# Patient Record
Sex: MALE | Race: OTHER | NOT HISPANIC OR LATINO | URBAN - METROPOLITAN AREA
[De-identification: names, ages, dates, MRNs, and addresses within clinical notes are randomized per-mention and may not be internally consistent; named-entity substitution may affect disease eponyms.]

---

## 2019-04-26 ENCOUNTER — EMERGENCY (EMERGENCY)
Facility: HOSPITAL | Age: 49
LOS: 1 days | Discharge: HOME | End: 2019-04-26
Admitting: EMERGENCY MEDICINE
Payer: SELF-PAY

## 2019-04-26 VITALS
RESPIRATION RATE: 16 BRPM | OXYGEN SATURATION: 100 % | TEMPERATURE: 99 F | HEART RATE: 86 BPM | SYSTOLIC BLOOD PRESSURE: 124 MMHG | DIASTOLIC BLOOD PRESSURE: 82 MMHG

## 2019-04-26 PROCEDURE — 99283 EMERGENCY DEPT VISIT LOW MDM: CPT

## 2019-04-26 NOTE — ED PROVIDER NOTE - PROGRESS NOTE DETAILS
pt was refusing xray until his bag was returned by EMS. Bag no returned and pt does not want xray, wants to be discharged. Pt understands risks of injury to back including broken back but still wants to leave.
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
## 2019-04-26 NOTE — ED PROVIDER NOTE - PHYSICAL EXAMINATION
CONSTITUTIONAL: Well-appearing; well-nourished; in no apparent distress.   HEAD: Normocephalic; atraumatic.   NECK: Supple; non-tender;   CARDIOVASCULAR: Normal S1, S2; no murmurs, rubs, or gallops. Regular rate and rhythm.   RESPIRATORY: Breathing easily; breath sounds clear and equal bilaterally; no wheezes, rhonchi, or rales.  MSK: FROM at all extremities,   Back: +ttp to   Neuro: CN 2-12 intact, normal finger to nose, normal gait, strength normal CONSTITUTIONAL: Well-appearing; well-nourished; in no apparent distress.   HEAD: Normocephalic; atraumatic.   NECK: Supple; non-tender;   CARDIOVASCULAR: Normal S1, S2; no murmurs, rubs, or gallops. Regular rate and rhythm.   RESPIRATORY: Breathing easily; breath sounds clear and equal bilaterally; no wheezes, rhonchi, or rales.  MSK: FROM at all extremities,   Back: +ttp to lumbar spinal and b/l paraspinal muscles, pain with movements   Neuro: CN 2-12 intact, normal finger to nose, normal gait, strength normal

## 2019-04-26 NOTE — ED ADULT TRIAGE NOTE - OTHER COMPLAINTS
hx of arthritis. ambulated to EMS stretcher without difficulty as per EMS. patient sleeping comfortably on arrival.

## 2019-04-26 NOTE — ED ADULT NURSE NOTE - CHPI ED NUR SYMPTOMS NEG
no weakness/no abrasion/no difficulty bearing weight/no tingling/no numbness/no stiffness/no bruising/no deformity/no fever
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
## 2019-04-30 DIAGNOSIS — E11.9 TYPE 2 DIABETES MELLITUS WITHOUT COMPLICATIONS: ICD-10-CM

## 2019-04-30 DIAGNOSIS — M54.9 DORSALGIA, UNSPECIFIED: ICD-10-CM
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
## 2019-04-30 DIAGNOSIS — S30.0XXA CONTUSION OF LOWER BACK AND PELVIS, INITIAL ENCOUNTER: ICD-10-CM
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
## 2023-03-16 ENCOUNTER — EMERGENCY (EMERGENCY)
Facility: HOSPITAL | Age: 53
LOS: 1 days | Discharge: HOME | End: 2023-03-16
Attending: EMERGENCY MEDICINE | Admitting: EMERGENCY MEDICINE
Payer: MEDICAID

## 2023-03-16 VITALS
DIASTOLIC BLOOD PRESSURE: 83 MMHG | HEART RATE: 92 BPM | OXYGEN SATURATION: 100 % | RESPIRATION RATE: 18 BRPM | TEMPERATURE: 98 F | SYSTOLIC BLOOD PRESSURE: 145 MMHG

## 2023-03-16 VITALS
SYSTOLIC BLOOD PRESSURE: 156 MMHG | HEIGHT: 64 IN | DIASTOLIC BLOOD PRESSURE: 87 MMHG | TEMPERATURE: 97 F | HEART RATE: 105 BPM | OXYGEN SATURATION: 97 % | RESPIRATION RATE: 18 BRPM | WEIGHT: 139.99 LBS

## 2023-03-16 PROCEDURE — 12002 RPR S/N/AX/GEN/TRNK2.6-7.5CM: CPT

## 2023-03-16 PROCEDURE — 70450 CT HEAD/BRAIN W/O DYE: CPT | Mod: 26

## 2023-03-16 PROCEDURE — 72125 CT NECK SPINE W/O DYE: CPT | Mod: 26

## 2023-03-16 PROCEDURE — 73130 X-RAY EXAM OF HAND: CPT | Mod: 26,RT

## 2023-03-16 PROCEDURE — 99285 EMERGENCY DEPT VISIT HI MDM: CPT | Mod: 25

## 2023-03-16 PROCEDURE — 73090 X-RAY EXAM OF FOREARM: CPT | Mod: 26,RT

## 2023-03-16 RX ORDER — ACETAMINOPHEN 500 MG
650 TABLET ORAL ONCE
Refills: 0 | Status: COMPLETED | OUTPATIENT
Start: 2023-03-16 | End: 2023-03-16

## 2023-03-16 RX ORDER — METHOCARBAMOL 500 MG/1
750 TABLET, FILM COATED ORAL ONCE
Refills: 0 | Status: COMPLETED | OUTPATIENT
Start: 2023-03-16 | End: 2023-03-16

## 2023-03-16 RX ORDER — IBUPROFEN 200 MG
600 TABLET ORAL ONCE
Refills: 0 | Status: COMPLETED | OUTPATIENT
Start: 2023-03-16 | End: 2023-03-16

## 2023-03-16 RX ORDER — TETANUS TOXOID, REDUCED DIPHTHERIA TOXOID AND ACELLULAR PERTUSSIS VACCINE, ADSORBED 5; 2.5; 8; 8; 2.5 [IU]/.5ML; [IU]/.5ML; UG/.5ML; UG/.5ML; UG/.5ML
0.5 SUSPENSION INTRAMUSCULAR ONCE
Refills: 0 | Status: COMPLETED | OUTPATIENT
Start: 2023-03-16 | End: 2023-03-16

## 2023-03-16 RX ADMIN — TETANUS TOXOID, REDUCED DIPHTHERIA TOXOID AND ACELLULAR PERTUSSIS VACCINE, ADSORBED 0.5 MILLILITER(S): 5; 2.5; 8; 8; 2.5 SUSPENSION INTRAMUSCULAR at 17:56

## 2023-03-16 RX ADMIN — Medication 600 MILLIGRAM(S): at 20:09

## 2023-03-16 NOTE — ED PROVIDER NOTE - IV ALTEPLASE EXCL ABS HIDDEN
What Type Of Note Output Would You Prefer (Optional)?: Standard Output
How Severe Is Your Rash?: moderate
Is This A New Presentation, Or A Follow-Up?: Rash
Additional History: Patient has some new medications Atorvastatin and amlodipine. Patient states he was using his Efudex cream as well. See photos.
show
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
## 2023-03-16 NOTE — ED PROVIDER NOTE - MUSCULOSKELETAL, MLM
R hand mild dorsum tenderness, no deformity, abrasion on top of dorsum of hand, not extending deeper than dermis. L forearm mid mild tenderness, no deformity
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
## 2023-03-16 NOTE — ED PROVIDER NOTE - CLINICAL SUMMARY MEDICAL DECISION MAKING FREE TEXT BOX
will update tetanus, provide analgesia and get xrays to r/o fracture and CT head/neck to r/o bleed/fx.

## 2023-03-16 NOTE — ED PROVIDER NOTE - PROGRESS NOTE DETAILS
normal scans, normal xrays - radiology report unavailable. local care provided. will dc under police custody

## 2023-03-16 NOTE — ED PROVIDER NOTE - PATIENT PORTAL LINK FT
You can access the FollowMyHealth Patient Portal offered by [facility] by registering at the following website: [email or web address]. By joining [facility]’s FollowMyHealth portal, you will also be able to view your health information using other applications (apps) compatible with our system.
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
## 2023-03-17 PROBLEM — M19.90 UNSPECIFIED OSTEOARTHRITIS, UNSPECIFIED SITE: Chronic | Status: ACTIVE | Noted: 2019-04-26

## 2023-03-20 DIAGNOSIS — Z23 ENCOUNTER FOR IMMUNIZATION: ICD-10-CM

## 2023-03-20 DIAGNOSIS — S09.90XA UNSPECIFIED INJURY OF HEAD, INITIAL ENCOUNTER: ICD-10-CM

## 2023-03-20 DIAGNOSIS — R42 DIZZINESS AND GIDDINESS: ICD-10-CM
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
## 2023-03-20 DIAGNOSIS — R51.9 HEADACHE, UNSPECIFIED: ICD-10-CM
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
## 2023-03-20 DIAGNOSIS — E11.9 TYPE 2 DIABETES MELLITUS WITHOUT COMPLICATIONS: ICD-10-CM

## 2023-03-20 DIAGNOSIS — M45.9 ANKYLOSING SPONDYLITIS OF UNSPECIFIED SITES IN SPINE: ICD-10-CM

## 2023-03-20 DIAGNOSIS — S60.416A ABRASION OF RIGHT LITTLE FINGER, INITIAL ENCOUNTER: ICD-10-CM

## 2024-04-29 NOTE — ED ADULT TRIAGE NOTE - HEART RATE (BEATS/MIN)
Case Management Assessment & Discharge Planning Note    Patient name Liu Angel  Location /-01 MRN [id number]  : 1940 Date 2024       Current Admission Date: 2024  Current Admission Diagnosis:Acute on chronic respiratory failure with hypoxia    Patient Active Problem List    Diagnosis Date Noted    Sepsis without acute organ dysfunction (HCC) 2024    Pneumonia of right lower lobe due to infectious organism 2024    Malignant spindle cell neoplasm (HCC) 2023    Stage 3a chronic kidney disease (HCC) 2023    Severe protein-calorie malnutrition (HCC) 2023    BPH (benign prostatic hyperplasia) 2023    Acute on chronic respiratory failure with hypoxia  2021    Pulmonary nodule 2021    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/2021    Chronic obstructive pulmonary disease with acute exacerbation (HCC) 2021    Chronic combined systolic and diastolic heart failure (HCC) 2021    RBBB 2021    Moderate alcohol consumption 2021    Localized edema 2021    Atrial fibrillation  2021    Essential hypertension       LOS (days): 2  Geometric Mean LOS (GMLOS) (days):   Days to GMLOS:     OBJECTIVE:    Risk of Unplanned Readmission Score: 22.24         Current admission status: Inpatient  Referral Reason:  (disposition planning)    Preferred Pharmacy:   [facility] - [place], [place] - [address] [place] [id number]  Phone: [phone] Fax: [phone]    [id number] - [place] - [address]  [address] [place] [address]  Phone: [phone] Fax: [phone]    [facility] - [place] - [address]  [address] [place] [address]  Phone: [phone] Fax: [phone]    Primary Care Provider: Lenore Correia DO    Primary Insurance: MEDICARE  Secondary Insurance:     ASSESSMENT:  Active Health  Care Proxies    There are no active Health Care Proxies on file.       Advance Directives  Does patient have a Health Care POA?: No  Was patient offered paperwork?: Yes  Does patient currently have a Health Care decision maker?: Yes, please see Health Care Proxy section  Does patient have Advance Directives?: No  Was patient offered paperwork?: Yes  Primary Contact: Constance Hatfield, spouse         Readmission Root Cause  30 Day Readmission: No    Patient Information  Admitted from:: Home  Mental Status: Alert  During Assessment patient was accompanied by: Not accompanied during assessment  Assessment information provided by:: Patient  Primary Caregiver: Self  Support Systems: Spouse/significant other  [facility] Residence: [facility]  What city do you live in[place]  Home entry access options. Select all that apply.: Stairs  Number of steps to enter home.: 1  Do the steps have railings?: No  Type of Current Residence: [facility]  Living Arrangements: Lives w/ Spouse/significant other  Is patient a ?: Yes ([employer], 20 years)  Is patient active with VA ([place] [facility])?: Yes  Is patient service connected?: Yes (100%)    Activities of Daily Living Prior to Admission  Functional Status: Independent  Completes ADLs independently?: Yes  Ambulates independently?: Yes  Does patient use assisted devices?: Yes  Assisted Devices (DME) used: Home Oxygen concentrator, Portable Oxygen tanks, BiPAP, Nebulizer  DME Company Name (respiratory supplies): [employer]  O2 Rate(s): 2L - 5L  Does patient currently own DME?: Yes  What DME does the patient currently own?: Straight Cane, BiPAP, Home Oxygen concentrator, Portable Oxygen tanks, Nebulizer  Does patient have a history of Outpatient Therapy (PT/OT)?: No  Does the patient have a history of Short-Term Rehab?: No  Does patient have a history of HHC?: Yes ([facility])  Does patient currently have HHC?: No         Patient Information Continued  Income Source: Pension/half-way  Does patient  have prescription coverage?: Yes  Does patient receive dialysis treatments?: No  Does patient have a history of substance abuse?: No  Does patient have a history of Mental Health Diagnosis?: No         Means of Transportation  Means of Transport to Appts:: Family transport      Social Determinants of Health (SDOH)      Flowsheet Row Most Recent Value   Housing Stability    In the last 12 months, was there a time when you were not able to pay the mortgage or rent on time? N   In the last 12 months, how many places have you lived? 1   In the last 12 months, was there a time when you did not have a steady place to sleep or slept in a shelter (including now)? N   Transportation Needs    In the past 12 months, has lack of transportation kept you from medical appointments or from getting medications? no   In the past 12 months, has lack of transportation kept you from meetings, work, or from getting things needed for daily living? No   Food Insecurity    Within the past 12 months, you worried that your food would run out before you got the money to buy more. Never true   Within the past 12 months, the food you bought just didn't last and you didn't have money to get more. Never true   Utilities    In the past 12 months has the electric, gas, oil, or water company threatened to shut off services in your home? No            DISCHARGE DETAILS:    Discharge planning discussed with:: patient  Freedom of Choice: Yes     CM contacted family/caregiver?: No- see comments (patient declined)                  CM met with patient at the bedside, baseline information was obtained. CM discussed the role of CM in helping the patient develop a discharge plan and assist the patient in carry out their plan.     Patient lives with spouse in ranch home. Patient has can at home, does not use typically. Patient has O@ and BIPAP through [facility] [place]     CM to follow for CM discharge referral needs.                                                    105